# Patient Record
Sex: FEMALE | URBAN - METROPOLITAN AREA
[De-identification: names, ages, dates, MRNs, and addresses within clinical notes are randomized per-mention and may not be internally consistent; named-entity substitution may affect disease eponyms.]

---

## 2022-06-07 ENCOUNTER — OFFICE VISIT (OUTPATIENT)
Dept: URBAN - METROPOLITAN AREA CLINIC 82 | Facility: CLINIC | Age: 22
End: 2022-06-07

## 2022-06-24 ENCOUNTER — WEB ENCOUNTER (OUTPATIENT)
Dept: URBAN - METROPOLITAN AREA CLINIC 90 | Facility: CLINIC | Age: 22
End: 2022-06-24

## 2022-06-28 ENCOUNTER — WEB ENCOUNTER (OUTPATIENT)
Dept: URBAN - METROPOLITAN AREA CLINIC 90 | Facility: CLINIC | Age: 22
End: 2022-06-28

## 2022-06-29 ENCOUNTER — OFFICE VISIT (OUTPATIENT)
Dept: URBAN - METROPOLITAN AREA CLINIC 90 | Facility: CLINIC | Age: 22
End: 2022-06-29
Payer: COMMERCIAL

## 2022-06-29 ENCOUNTER — DASHBOARD ENCOUNTERS (OUTPATIENT)
Age: 22
End: 2022-06-29

## 2022-06-29 DIAGNOSIS — K21.9 GERD WITHOUT ESOPHAGITIS: ICD-10-CM

## 2022-06-29 DIAGNOSIS — R07.89 OTHER CHEST PAIN: ICD-10-CM

## 2022-06-29 PROCEDURE — 99204 OFFICE O/P NEW MOD 45 MIN: CPT | Performed by: PEDIATRICS

## 2022-06-29 RX ORDER — PANTOPRAZOLE SODIUM 40 MG/1
1 PACKET MIXED WITH APPLE JUICE OR APPLESAUCE GRANULE, DELAYED RELEASE ORAL ONCE A DAY
Qty: 30 PACKETS | Refills: 2 | OUTPATIENT
Start: 2022-06-29

## 2022-06-29 RX ORDER — ONDANSETRON 4 MG/1
1 TAB TABLET, ORALLY DISINTEGRATING ORAL
Qty: 20 | Refills: 1 | OUTPATIENT
Start: 2022-06-29

## 2022-06-29 NOTE — HPI-TODAY'S VISIT:
Jessika presents for evaluation of reflux. History is provided by the patient and his mother.    She had issues with GERD (zantac or pantoprazole x 6 months) 5 years ago which resolved.  She was well until May 23 when she began having chest tightness leading to SOB. Seen by pulmonology who feels this may be reflux.   Since then, she has early morning regurgitation with chest burning, sometimes throat burning also.  Frequent belching is noted, as is nausea after eating.  No specific food triggers are noted.  No abdominal pain, vomiting or dysphagia. Appetite is normal, but eats slightly less currently. No diet restrictions.   No flatulence or bloating.    She had 20 lb weight loss in 2015 and loses 2 lbs with menstrual cycle but she gains it back.  Drinks Ensure 1 bottle per day.  Stooling daily, bristol type 3-4 and hard sometimes. She has had mild bright red blood with hard stools twice in the past year.  Currently on miralax as needed (makes her nauseous), prune juice.  Recently has used tums and mylanta for reflux which have not helped.    She also has a history of neutropenia and syncope with menstrual cramps.  6/4/22:  WBC 2.8, Hgb 12.5, Plts 294, . CRP 5.3  Had prior EGD at age 10 (Dr. Monten[place]) which is reportedly normal.

## 2022-07-01 PROBLEM — 266435005: Status: ACTIVE | Noted: 2022-06-29

## 2022-08-08 ENCOUNTER — TELEPHONE ENCOUNTER (OUTPATIENT)
Dept: URBAN - METROPOLITAN AREA CLINIC 90 | Facility: CLINIC | Age: 22
End: 2022-08-08

## 2022-08-08 RX ORDER — PANTOPRAZOLE SODIUM 40 MG/1
1 PACKET MIXED WITH APPLE JUICE OR APPLESAUCE GRANULE, DELAYED RELEASE ORAL ONCE A DAY
OUTPATIENT
Start: 2022-06-29

## 2022-08-08 RX ORDER — OMEPRAZOLE 40 MG/1
1 CAP CAPSULE, DELAYED RELEASE ORAL
Qty: 30 | Refills: 2 | OUTPATIENT
Start: 2022-08-08